# Patient Record
Sex: FEMALE | URBAN - METROPOLITAN AREA
[De-identification: names, ages, dates, MRNs, and addresses within clinical notes are randomized per-mention and may not be internally consistent; named-entity substitution may affect disease eponyms.]

---

## 2018-04-03 ENCOUNTER — APPOINTMENT (RX ONLY)
Dept: URBAN - METROPOLITAN AREA CLINIC 64 | Facility: CLINIC | Age: 68
Setting detail: DERMATOLOGY
End: 2018-04-03

## 2018-04-03 DIAGNOSIS — L81.4 OTHER MELANIN HYPERPIGMENTATION: ICD-10-CM

## 2018-04-03 DIAGNOSIS — L82.1 OTHER SEBORRHEIC KERATOSIS: ICD-10-CM

## 2018-04-03 PROBLEM — E03.9 HYPOTHYROIDISM, UNSPECIFIED: Status: ACTIVE | Noted: 2018-04-03

## 2018-04-03 PROCEDURE — ? LIQUID NITROGEN (COSMETIC)

## 2018-04-03 PROCEDURE — 99213 OFFICE O/P EST LOW 20 MIN: CPT

## 2018-04-03 PROCEDURE — ? COUNSELING

## 2018-04-03 ASSESSMENT — LOCATION DETAILED DESCRIPTION DERM
LOCATION DETAILED: LEFT INFERIOR CENTRAL MALAR CHEEK
LOCATION DETAILED: RIGHT INFERIOR LATERAL NECK

## 2018-04-03 ASSESSMENT — LOCATION SIMPLE DESCRIPTION DERM
LOCATION SIMPLE: LEFT CHEEK
LOCATION SIMPLE: RIGHT ANTERIOR NECK

## 2018-04-03 ASSESSMENT — LOCATION ZONE DERM
LOCATION ZONE: FACE
LOCATION ZONE: NECK

## 2018-04-03 NOTE — PROCEDURE: LIQUID NITROGEN (COSMETIC)
Detail Level: Zone
Consent: The patient's consent was obtained including but not limited to risks of crusting, scabbing, blistering, scarring, darker or lighter pigmentary change, recurrence, incomplete removal and infection.
Price (Use Numbers Only, No Special Characters Or $): 200
Post-Care Instructions: I reviewed with the patient in detail post-care instructions. Patient is to wear sunprotection, and avoid picking at any of the treated lesions. Pt may apply Vaseline to crusted or scabbing areas.
Total Number Of Lesions Treated: 14

## 2018-04-03 NOTE — PROCEDURE: COUNSELING
Detail Level: Simple
Detail Level: Detailed
Patient Specific Counseling (Will Not Stick From Patient To Patient): The patient's consent was obtained including but not limited to risks of crusting, scabbing, blistering, scarring, darker or lighter pigmentary change, recurrence, incomplete removal and infection.

## 2020-01-07 ENCOUNTER — LAB REQUISITION (OUTPATIENT)
Dept: LAB | Facility: HOSPITAL | Age: 70
End: 2020-01-07

## 2020-01-07 ENCOUNTER — OUTSIDE FACILITY SERVICE (OUTPATIENT)
Dept: GASTROENTEROLOGY | Facility: CLINIC | Age: 70
End: 2020-01-07

## 2020-01-07 DIAGNOSIS — Z12.11 ENCOUNTER FOR SCREENING FOR MALIGNANT NEOPLASM OF COLON: ICD-10-CM

## 2020-01-07 PROCEDURE — 88305 TISSUE EXAM BY PATHOLOGIST: CPT | Performed by: INTERNAL MEDICINE

## 2020-01-07 PROCEDURE — 45385 COLONOSCOPY W/LESION REMOVAL: CPT | Performed by: INTERNAL MEDICINE

## 2020-01-08 LAB
CYTO UR: NORMAL
LAB AP CASE REPORT: NORMAL
LAB AP CLINICAL INFORMATION: NORMAL
PATH REPORT.FINAL DX SPEC: NORMAL
PATH REPORT.GROSS SPEC: NORMAL

## 2023-12-19 ENCOUNTER — OFFICE VISIT (OUTPATIENT)
Dept: ORTHOPEDIC SURGERY | Facility: CLINIC | Age: 73
End: 2023-12-19
Payer: MEDICARE

## 2023-12-19 VITALS
SYSTOLIC BLOOD PRESSURE: 128 MMHG | BODY MASS INDEX: 28.93 KG/M2 | HEIGHT: 66 IN | WEIGHT: 180 LBS | DIASTOLIC BLOOD PRESSURE: 84 MMHG

## 2023-12-19 DIAGNOSIS — S52.572A OTHER CLOSED INTRA-ARTICULAR FRACTURE OF DISTAL END OF LEFT RADIUS, INITIAL ENCOUNTER: Primary | ICD-10-CM

## 2023-12-19 RX ORDER — OMEPRAZOLE 20 MG/1
20 TABLET, DELAYED RELEASE ORAL DAILY
COMMUNITY

## 2023-12-19 RX ORDER — BUPROPION HYDROCHLORIDE 150 MG/1
TABLET ORAL
COMMUNITY
Start: 2023-12-01

## 2023-12-19 RX ORDER — AMPICILLIN TRIHYDRATE 250 MG
500 CAPSULE ORAL DAILY
COMMUNITY

## 2023-12-19 RX ORDER — HYDROCODONE BITARTRATE AND ACETAMINOPHEN 5; 325 MG/1; MG/1
1 TABLET ORAL EVERY 6 HOURS PRN
Qty: 15 TABLET | Refills: 0 | Status: SHIPPED | OUTPATIENT
Start: 2023-12-19 | End: 2023-12-21 | Stop reason: SDUPTHER

## 2023-12-19 RX ORDER — LEVOTHYROXINE SODIUM 112 UG/1
TABLET ORAL
COMMUNITY
Start: 2023-10-12

## 2023-12-19 RX ORDER — OXYCODONE HYDROCHLORIDE 5 MG/1
5 TABLET ORAL EVERY 6 HOURS PRN
Qty: 15 TABLET | Refills: 0 | Status: SHIPPED | OUTPATIENT
Start: 2023-12-19 | End: 2023-12-19

## 2023-12-19 RX ORDER — FEXOFENADINE HCL 180 MG/1
180 TABLET ORAL DAILY
COMMUNITY

## 2023-12-19 NOTE — LETTER
December 19, 2023       No Recipients    Patient: Salud Dumont   YOB: 1950   Date of Visit: 12/19/2023     Dear Dr. Delgado Recipients:    Thank you for referring Salud Dumont to me for evaluation. Below are the relevant portions of my assessment and plan of care.    If you have questions, please do not hesitate to call me. I look forward to following Salud along with you.         Sincerely,        Arnold Toro MD        CC:   No Recipients      Progress Notes:      [facility]        Subjective     Pain of the Left Wrist (DOI: 12/16/23)      HPI    Salud Dumont is a 73 y.o. female.  Patient is a 73-year-old right-hand-dominant female who injured her left distal radius on 12/16/2023 when she was at her nephew's house and 2 dogs were fighting and caused her to fall on outstretched left wrist.  She went to the [facility] in [place] and underwent a closed reduction.  She is placed in a splint and sent here for further evaluation and treatment.          Past Medical History:   Diagnosis Date   • Fracture, radius 12/16/23      Past Surgical History:   Procedure Laterality Date   • BACK SURGERY  1990   • SHOULDER SURGERY  2004 and 2008      History reviewed. No pertinent family history.  Social History     Socioeconomic History   • Marital status:    Tobacco Use   • Smoking status: Former     Packs/day: 1     Types: Cigarettes   • Tobacco comments:     Quit in 1993   Substance and Sexual Activity   • Alcohol use: Yes     Alcohol/week: 4.0 standard drinks of alcohol     Types: 4 Glasses of wine per week   • Drug use: Never   • Sexual activity: Not Currently     Partners: Male      Current Outpatient Medications on File Prior to Visit   Medication Sig Dispense Refill   • B Complex Vitamins (B COMPLEX PO) Take 1 tablet by mouth Daily.     • buPROPion XL (WELLBUTRIN XL) 150 MG 24 hr tablet      • Cholecalciferol 125 MCG (5000 UT) tablet Take 1  "tablet by mouth Daily.     • Cinnamon 500 MG capsule Take 1 capsule by mouth Daily.     • fexofenadine (ALLEGRA) 180 MG tablet Take 1 tablet by mouth Daily.     • levothyroxine (SYNTHROID, LEVOTHROID) 112 MCG tablet      • omeprazole OTC (PriLOSEC OTC) 20 MG EC tablet Take 1 tablet by mouth Daily.     • polyethylene glycol (GoLYTELY) 236 g solution Follow Directions On Paperwork Mailed From Office. If You Have Questions Call [phone]. 4000 mL 0     No current facility-administered medications on file prior to visit.      No Known Allergies       Review of Systems   Constitutional: Negative.    HENT: Negative.     Eyes: Negative.    Respiratory: Negative.     Cardiovascular: Negative.    Gastrointestinal: Negative.    Endocrine: Negative.    Genitourinary: Negative.    Musculoskeletal: Negative.    Skin: Negative.    Allergic/Immunologic: Negative.    Neurological: Negative.    Hematological: Negative.    Psychiatric/Behavioral: Negative.          I reviewed the patient's chief complaint, history of present illness, review of systems, past medical history, surgical history, family history, social history, medications and allergy list.        Objective      Physical Exam  /84   Ht 166.4 cm (65.5\") Comment: Patient declined to get weight and supplied weight  Wt 81.6 kg (180 lb) Comment: Patient declined to get weight and supplied it.  BMI 29.50 kg/m²     Body mass index is 29.5 kg/m².    General  Mental Status - alert  General Appearance - cooperative, well groomed, not in acute distress  Orientation - Oriented X3  Build & Nutrition - well developed and well nourished  Posture - normal posture  Gait - normal gait       Ortho Exam  Peripheral Vascular   Left Upper Extremity    No cyanotic nail beds    Pink nail beds and rapid capillary refill   Palpation    Radial Pulse - Bilaterally normal    Musculoskeletal   Left Upper Extremity   Radius:    Inspection and Palpation:    Tenderness -Moderately tender and " about the wrist    Swelling - present    Effusion - na    Muscle tone - no atrophy    Pulses - +2   Deformities/Malalignments/Discrepancies    Normal bony contour    There is a documented closed fracture : location - left - distal end       Imaging/Studies  Imaging Results (Last 24 Hours)       ** No results found for the last 24 hours. **          We have reviewed images and report of an x-ray pre and postreduction of the patient's left wrist from 12/16/2023.  Patient appears to have an intra-articular fracture of the left distal radius.  Postreduction films show significant comminution of the intra-articular portion.    Assessment   Assessment:  1. Other closed intra-articular fracture of distal end of left radius, initial encounter        Plan:  Continue over-the-counter medication as needed for discomfort  Intra-articular left distal radius fracture--plan is for CT scan today and patient will hopefully see Dr. Laguna tomorrow for discussion about possible surgical intervention and options.  She will be given a prescription for hydrocodone today.        Arnold Toro MD  12/19/23  08:45 EST      Dictated Utilizing Dragon Dictation.

## 2023-12-19 NOTE — PROGRESS NOTES
[facility] [place] [facility]        Subjective     Pain of the Left Wrist (DOI: 12/16/23)      CARLOS Dumont is a 73 y.o. female.  Patient is a 73-year-old right-hand-dominant female who injured her left distal radius on 12/16/2023 when she was at her nephew's house and 2 dogs were fighting and caused her to fall on outstretched left wrist.  She went to the [facility] in [place] and underwent a closed reduction.  She is placed in a splint and sent here for further evaluation and treatment.          Past Medical History:   Diagnosis Date    Fracture, radius 12/16/23      Past Surgical History:   Procedure Laterality Date    BACK SURGERY  1990    SHOULDER SURGERY  2004 and 2008      History reviewed. No pertinent family history.  Social History     Socioeconomic History    Marital status:    Tobacco Use    Smoking status: Former     Packs/day: 1     Types: Cigarettes    Tobacco comments:     Quit in 1993   Substance and Sexual Activity    Alcohol use: Yes     Alcohol/week: 4.0 standard drinks of alcohol     Types: 4 Glasses of wine per week    Drug use: Never    Sexual activity: Not Currently     Partners: Male      Current Outpatient Medications on File Prior to Visit   Medication Sig Dispense Refill    B Complex Vitamins (B COMPLEX PO) Take 1 tablet by mouth Daily.      buPROPion XL (WELLBUTRIN XL) 150 MG 24 hr tablet       Cholecalciferol 125 MCG (5000 UT) tablet Take 1 tablet by mouth Daily.      Cinnamon 500 MG capsule Take 1 capsule by mouth Daily.      fexofenadine (ALLEGRA) 180 MG tablet Take 1 tablet by mouth Daily.      levothyroxine (SYNTHROID, LEVOTHROID) 112 MCG tablet       omeprazole OTC (PriLOSEC OTC) 20 MG EC tablet Take 1 tablet by mouth Daily.      polyethylene glycol (GoLYTELY) 236 g solution Follow Directions On Paperwork Mailed From Office. If You Have Questions Call [phone]. 4000 mL 0     No current facility-administered medications on file prior to  "visit.      No Known Allergies       Review of Systems   Constitutional: Negative.    HENT: Negative.     Eyes: Negative.    Respiratory: Negative.     Cardiovascular: Negative.    Gastrointestinal: Negative.    Endocrine: Negative.    Genitourinary: Negative.    Musculoskeletal: Negative.    Skin: Negative.    Allergic/Immunologic: Negative.    Neurological: Negative.    Hematological: Negative.    Psychiatric/Behavioral: Negative.          I reviewed the patient's chief complaint, history of present illness, review of systems, past medical history, surgical history, family history, social history, medications and allergy list.        Objective      Physical Exam  /84   Ht 166.4 cm (65.5\") Comment: Patient declined to get weight and supplied weight  Wt 81.6 kg (180 lb) Comment: Patient declined to get weight and supplied it.  BMI 29.50 kg/m²     Body mass index is 29.5 kg/m².    General  Mental Status - alert  General Appearance - cooperative, well groomed, not in acute distress  Orientation - Oriented X3  Build & Nutrition - well developed and well nourished  Posture - normal posture  Gait - normal gait       Ortho Exam  Peripheral Vascular   Left Upper Extremity    No cyanotic nail beds    Pink nail beds and rapid capillary refill   Palpation    Radial Pulse - Bilaterally normal    Musculoskeletal   Left Upper Extremity   Radius:    Inspection and Palpation:    Tenderness -Moderately tender and about the wrist    Swelling - present    Effusion - na    Muscle tone - no atrophy    Pulses - +2   Deformities/Malalignments/Discrepancies    Normal bony contour    There is a documented closed fracture : location - left - distal end       Imaging/Studies  Imaging Results (Last 24 Hours)       ** No results found for the last 24 hours. **          We have reviewed images and report of an x-ray pre and postreduction of the patient's left wrist from 12/16/2023.  Patient appears to have an intra-articular fracture " of the left distal radius.  Postreduction films show significant comminution of the intra-articular portion.    Assessment    Assessment:  1. Other closed intra-articular fracture of distal end of left radius, initial encounter        Plan:  Continue over-the-counter medication as needed for discomfort  Intra-articular left distal radius fracture--plan is for CT scan today and patient will hopefully see Dr. Laguna tomorrow for discussion about possible surgical intervention and options.  She will be given a prescription for hydrocodone today.        Arnold Toro MD  12/19/23  08:45 EST      Dictated Utilizing Dragon Dictation.

## 2023-12-20 ENCOUNTER — HOSPITAL ENCOUNTER (OUTPATIENT)
Dept: CT IMAGING | Facility: HOSPITAL | Age: 73
Discharge: HOME | End: 2023-12-20
Admitting: ORTHOPAEDIC SURGERY
Payer: MEDICARE

## 2023-12-20 ENCOUNTER — OFFICE VISIT (OUTPATIENT)
Dept: ORTHOPEDIC SURGERY | Facility: CLINIC | Age: 73
End: 2023-12-20
Payer: MEDICARE

## 2023-12-20 VITALS
BODY MASS INDEX: 31.21 KG/M2 | WEIGHT: 194.2 LBS | DIASTOLIC BLOOD PRESSURE: 72 MMHG | HEIGHT: 66 IN | SYSTOLIC BLOOD PRESSURE: 160 MMHG

## 2023-12-20 DIAGNOSIS — S52.572A OTHER CLOSED INTRA-ARTICULAR FRACTURE OF DISTAL END OF LEFT RADIUS, INITIAL ENCOUNTER: Primary | ICD-10-CM

## 2023-12-20 DIAGNOSIS — S52.572A OTHER CLOSED INTRA-ARTICULAR FRACTURE OF DISTAL END OF LEFT RADIUS, INITIAL ENCOUNTER: ICD-10-CM

## 2023-12-20 PROCEDURE — 73200 CT UPPER EXTREMITY W/O DYE: CPT

## 2023-12-20 RX ORDER — OXYCODONE HYDROCHLORIDE 5 MG/1
TABLET ORAL AS NEEDED
COMMUNITY
Start: 2023-12-19

## 2023-12-20 NOTE — PROGRESS NOTES
[facility]     Office Visit       Date: 12/20/2023   Patient Name: Salud Dumont  MRN: [id number]  YOB: 1950    Referring Physician: No ref. provider found     Chief Complaint:   Chief Complaint   Patient presents with    Left Wrist - Pain     Other closed intra-articular fracture of distal end of left radius (DOI: 12/16/23)       History of Present Illness:   Salud Dumont is a 73 y.o. female  R-hand-dominant presents with a left distal radius fracture sustained after a chemical fall from standing after being knocked over by her dogs.     Since the injury, patient saw my partner Dr. Jaramillo who referred her to me.  She had a CT scan which demonstrates a comminuted left articular distal radius fracture.  She denies numbness and tingling.  [place]  He is not working.  Denies smoking.  Otherwise healthy and active.         Subjective   Review of Systems:   Review of Systems   Constitutional: Negative.  Negative for chills, fatigue and fever.   HENT: Negative.  Negative for congestion and dental problem.    Eyes: Negative.  Negative for blurred vision.   Respiratory: Negative.  Negative for shortness of breath.    Cardiovascular: Negative.  Negative for leg swelling.   Gastrointestinal: Negative.  Negative for abdominal pain.   Endocrine: Negative.  Negative for polyuria.   Genitourinary: Negative.  Negative for difficulty urinating.   Musculoskeletal:  Positive for arthralgias.   Skin: Negative.    Allergic/Immunologic: Negative.    Neurological: Negative.    Hematological: Negative.  Negative for adenopathy.   Psychiatric/Behavioral: Negative.  Negative for behavioral problems.         Pertinent review of systems per HPI.     I reviewed the patient's chief complaint, history of present illness, review of systems, past medical history, surgical history, family history, social history,  "medications and allergy list in the EMR on 12/20/2023 and agree with the findings above.    Objective    Vital Signs:   Vitals:    12/20/23 0930   BP: 160/72   Weight: 88.1 kg (194 lb 3.2 oz)   Height: 166.4 cm (65.5\")     BMI: BMI is >= 30 and <35. (Class 1 Obesity). The following options were offered after discussion;: weight loss educational material (shared in after visit summary)       General Appearance: No acute distress. Alert and oriented.     Chest:  Non-labored breathing on room air. Regular rate and rhythm.    Left upper Extremity Exam:    No palpable masses or visible lesions  Fingers are warm, well-perfused with appropriate capillary refill.  Palpable radial pulse.    Sensation intact to light touch in median, radial and ulnar nerve distributions.    Motor-patient is able to grossly wiggle her fingers under the splint    Non-tender except for in the areas highlighted below    Splint intact.  Swelling and edema just proximal to the splint.    Imaging/Studies:   Imaging Results (Last 24 Hours)       ** No results found for the last 24 hours. **            X-ray and CT scan of the left wrist from 12/20/2023 was independently reviewed and interpreted by myself demonstrates a comminuted intra-articular fracture of the left distal radius    Procedures:  Procedures    Quality Measures:   ACP:   ACP discussion was declined by the patient. Patient does not have an advance directive, declines further assistance.    Tobacco:   Salud Franklin Julia  reports that she has quit smoking. Her smoking use included cigarettes. She smoked an average of 1 pack per day. She does not have any smokeless tobacco history on file..     Assessment / Plan    Assessment/Plan:     Diagnoses and all orders for this visit:    Other closed intra-articular fracture of distal end of left radius, initial encounter  -     External Facility Surgical/Procedural Request; Future    Other orders  -     oxyCODONE (ROXICODONE) 5 MG immediate " release tablet; As Needed.         Salud Pearce a 73 y.o. female who presents with:      ICD-10-CM ICD-9-CM   1. Other closed intra-articular fracture of distal end of left radius, initial encounter  S52.572A 813.42         Regarding the patient's distal radius fracture:        Given the patient's age, activity level, and the fracture pattern I would recommend operative management with open reduction and internal fixation of the fracture.  Fracture does appear amenable to fixation with a volar locking plate however given her age, the intra-articular comminution she may warrant supplemental fixation with a dorsal spanning plate.  Her  is an orthopedic surgeon and she expressed understanding of the procedure.    Consent-open reduction internal fixation of left distal radius fracture, possible dorsal spanning plate    The risks and benefits of the procedure were discussed with the patient and or appropriate guardian, which include but are not limited to the risk of bleeding, infection, neurovascular damage, post-operative stiffness, recurrence, tendon and/or ligament retears, recurrent instability, continued pain, arthritic pain, need for further revision surgeries in the future, deep venous thrombosis, and general risks from anesthesia. We also discussed the post-operative rehabilitation, the need for physical therapy, and the overall expected outcomes from the procedure. We also discussed the possible use of biologics including allograft. We allowed proper time and answered the patient's questions regarding the procedure. The patient expressed understanding. Knowing what the risks are and what the conservative treatment is, the patient elected to forgo any further conservative treatment options and proceed with the surgical intervention. A surgical consent was signed.      Follow Up:   Return for Follow Up after Post Op.        Lyle Laguna MD  [facility]

## 2023-12-21 ENCOUNTER — DOCUMENTATION (OUTPATIENT)
Age: 73
End: 2023-12-21
Payer: MEDICARE

## 2023-12-21 ENCOUNTER — OUTSIDE FACILITY SERVICE (OUTPATIENT)
Dept: ORTHOPEDIC SURGERY | Facility: CLINIC | Age: 73
End: 2023-12-21
Payer: MEDICARE

## 2023-12-21 DIAGNOSIS — S52.572A OTHER CLOSED INTRA-ARTICULAR FRACTURE OF DISTAL END OF LEFT RADIUS, INITIAL ENCOUNTER: ICD-10-CM

## 2023-12-21 PROCEDURE — 73110 X-RAY EXAM OF WRIST: CPT | Performed by: PLASTIC SURGERY

## 2023-12-21 PROCEDURE — 25609 OPTX DST RD XART FX/EP SEP3+: CPT | Performed by: PLASTIC SURGERY

## 2023-12-21 RX ORDER — HYDROCODONE BITARTRATE AND ACETAMINOPHEN 5; 325 MG/1; MG/1
1 TABLET ORAL EVERY 6 HOURS PRN
Qty: 20 TABLET | Refills: 0 | Status: SHIPPED | OUTPATIENT
Start: 2023-12-21

## 2023-12-21 NOTE — PROGRESS NOTES
DATE OF PROCEDURE: 12/21/2023    LOCATION: [facility]     PROCEDURES PERFORMED:    1.  Open reduction internal fixation of left distal radius intra-articular fracture <3 parts CPT 81310      SURGEON: Lyle Laguna MD      ASSISTANTS:    1. none        * Surgery not found *      ANESTHESIA: Axillary block with general anesthesia    PREOPERATIVE DIAGNOSES:  1.  Left distal radius fracture     POSTOPERATIVE DIAGNOSES:    Same     ESTIMATED BLOOD LOSS: 10 mL.    SPECIMENS: none    IMPLANTS: [employer] Geminus 4-hole narrow distal radius volar locking plate    COMPLICATIONS: None     INDICATIONS:  Salud Dumont is a 73 y.o. female who initially presented with an intra-articular fracture of the left distal radius status post mechanical fall from standing.  The risks, benefits, alternatives and potential complications of surgery were discussed with the patient and they agreed to proceed with surgery. A surgical informed consent was signed prior to the procedure.     DESCRIPTION OF PROCEDURE:  The patient was greeted in the pre-operative holding area and the surgical site was marked and consent confirmed prior to bringing the patient to the operating room.  The patient then received a left axillary block that was placed by the anesthesia team.  The patient was then taken to the operating room and a timeout was performed including the patient's name, procedure and antibiotic administration prior to the patient receiving general anesthesia.  The patient was positioned supine on the operative room table and a non-sterile tourniquet was applied to the left upper extremity and it was then prepped and draped in the usual sterile fashion.  The patient received the appropriate antibiotics within 1 hour of skin incision.     The left upper extremity was then exsanguinated using an Esmarch and the tourniquet set to 250 mmHg.  An incision was made directly over the location of the FCR tendon  with a hockey-stick extension distally.  Dissection was then carried through the superficial sheath of the FCR tendon and it was retracted ulnarly.  The floor of the FCR sheath was then incised sharply, taking care to protect the superficial branch of the radial artery.  Blunt dissection was then used to sweep the FPL and contents of the carpal tunnel away from the pronator quadratus and then retracted ulnarly.  The pronator quadratus was then sharply incised from its radial and distal aspect using a 15 blade scalpel and periosteal dissection was used to expose distal radius metaphysis and the fracture.  The brachioradialis was then identified at its attachment to the radial styloid and then incised sharply to allow easier mobilization of the radial styloid fragment.  Exposure of the fracture revealed multiple intra-articular fragments including a volar ulnar corner and radial styloid fragment. I felt that these were large, stable and proximal enough to support a volar locking plate.     After the fracture was identified 10 pounds of traction were applied to the index and middle finger to help restore length and radial inclination.  The fracture was then debrided of any early callus and gently mobilized using a rongeur and [place]. The reduction was then completed by flexing the distal fragment and applying ulnar deviation to the hand.  Reduction was then confirmed using fluoroscopy.    A [employer] Geminus 4 hole narrow distal radius volar locking plate was then selected for fixation. This was applied and then temporarily fixated distally using 2.62 K wires in the designated holes. Fluoroscopy was then used to confirm and adjust the plate position until we were happy with both the position and the reduction. Next a bicortical nonlocking screw was placed in the oblong hole.  The distal locking holes were then filled with locking screws.  A .62 k-wire was place transversely from the radial styloid into the ulnar  fragment for additional stabilization. The remaining proximal holes were then drilled and fixated with locking screws.     After complete fixation of the plate we loaded the joint with extension, flexion, and axial loading under fluoroscopy to ensure that our construct was stable.  DRUJ was then stressed and both pronation and supination and found to be stable.     The tourniquet was then let down and hemostasis achieved using bipolar electrocautery.  The wound was irrigated with copious amounts of normal saline solution.  The pronator was then redraped over the plate and skin closed in layered fashion using 3-0 Monocryl in deep dermal fashion followed by 4-0 Monocryl in running subcuticular fashion.     A sterile dressing was then applied with Steri-Strips, 4 x 4's, Webril and a volar splint.     At the end of the procedure the patient was awoken from anesthesia and transferred to the PACU in stable condition.  I participated in all parts of the case.

## 2023-12-26 ENCOUNTER — DOCUMENTATION (OUTPATIENT)
Dept: ORTHOPEDIC SURGERY | Facility: CLINIC | Age: 73
End: 2023-12-26
Payer: MEDICARE

## 2023-12-26 NOTE — PROGRESS NOTES
Left Wrist Intra-operative Fluoroscopy 12/21/23    [facility]    Indication: s/p ORIF    Views:  AP, Lateral, and 30 degrees lateral joint view     Comparison: preoperative x-rays left wrist    Findings:  Patient is status post ORIF of the distal radius.  Alignment is improved..  The hardware is intact.    Normal soft tissues.  Normal joint spaces      Impression:  X-ray of the left wrist personally reviewed by me.  Fracture of the left distal radius s/p ORIF with intact hardware.

## 2023-12-27 ENCOUNTER — OFFICE VISIT (OUTPATIENT)
Dept: ORTHOPEDIC SURGERY | Facility: CLINIC | Age: 73
End: 2023-12-27
Payer: MEDICARE

## 2023-12-27 VITALS — TEMPERATURE: 98.7 F

## 2023-12-27 DIAGNOSIS — S52.572A OTHER CLOSED INTRA-ARTICULAR FRACTURE OF DISTAL END OF LEFT RADIUS, INITIAL ENCOUNTER: Primary | ICD-10-CM

## 2023-12-27 PROCEDURE — 1160F RVW MEDS BY RX/DR IN RCRD: CPT | Performed by: PLASTIC SURGERY

## 2023-12-27 PROCEDURE — 1159F MED LIST DOCD IN RCRD: CPT | Performed by: PLASTIC SURGERY

## 2023-12-27 PROCEDURE — 99024 POSTOP FOLLOW-UP VISIT: CPT | Performed by: PLASTIC SURGERY

## 2023-12-27 NOTE — PROGRESS NOTES
[facility]     Follow-up Office Visit       Date: 12/27/2023   Patient Name: Salud Dumont  MRN: [id number]  YOB: 1950    Chief Complaint:   Chief Complaint   Patient presents with    Post-op     1 week s/p Left Wrist Intra-operative Fluoroscopy  DOS 12/21/23         History of Present Illness:   Salud Dumont is a 73 y.o. female is 1 week status post left distal radius ORIF.  She reports she been doing well since surgery.  She reports some left thumb stiffness as well as pain at her radial styloid pin site but otherwise no complaints.  Denies numbness or tingling.      Subjective   Review of Systems:   Review of Systems   Constitutional: Negative.  Negative for chills, fatigue and fever.   HENT: Negative.  Negative for congestion and dental problem.    Eyes: Negative.  Negative for blurred vision.   Respiratory: Negative.  Negative for shortness of breath.    Cardiovascular: Negative.  Negative for leg swelling.   Gastrointestinal: Negative.  Negative for abdominal pain.   Endocrine: Negative.  Negative for polyuria.   Genitourinary: Negative.  Negative for difficulty urinating.   Musculoskeletal:  Positive for arthralgias.   Skin: Negative.    Allergic/Immunologic: Negative.    Neurological: Negative.    Hematological: Negative.  Negative for adenopathy.   Psychiatric/Behavioral: Negative.  Negative for behavioral problems.         Pertinent review of systems per HPI    I reviewed the patient's chief complaint, history of present illness, review of systems, past medical history, surgical history, family history, social history, medications and allergy list in the EMR on 12/27/2023 and agree with the findings above.    Objective    Vital Signs:   Vitals:    12/27/23 0821   Temp: 98.7 °F (37.1 °C)     BMI: BMI is >= 30 and <35. (Class 1 Obesity). The following options were offered after discussion;:  weight loss educational material (shared in after visit summary)       General Appearance: No acute distress. Alert and oriented.     Chest:  Non-labored breathing on room air      Left upper Extremity:  Left Steri-Strips intact clear dry intact.  Radial styloid pin site closed and clear dry intact.  Fingers warm and well-perfused distally  Sensation intact to light touch in the median, radian and ulnar nerve distributions    Imaging/Studies:   Imaging Results (Last 24 Hours)       Procedure Component Value Units Date/Time    XR Wrist 3+ View Left [[id number]] Resulted: 12/27/23 0855     Updated: 12/27/23 0857    Narrative:      Left Wrist X-Ray    Indication: s/p ORIF    Views:  AP, Lateral, and Oblique     Comparison: None    Findings:  Patient is status post ORIF of the distal radius.  Alignment remains stable.  The fracture appears to be healing appropriately.  The hardware is intact.      Normal soft tissues.  Normal joint spaces      Impression:  X-ray of the left wrist personally reviewed by me.  Fracture   of the left distal radius s/p ORIF  with intact hardware.                   Procedures:  Procedures    Quality Measures:   ACP:   ACP discussion was declined by the patient. Patient does not have an advance directive, declines further assistance.    Tobacco:   Salud Franklin Nathaliearmin  reports that she has quit smoking. Her smoking use included cigarettes. She smoked an average of 1 pack per day. She does not have any smokeless tobacco history on file..    Assessment / Plan    Assessment/Plan:      Diagnosis Plan   1. Other closed intra-articular fracture of distal end of left radius, initial encounter  XR Wrist 3+ View Left          Patient presents 1 week follow-up status post left distal radius ORIF.  She has been doing well postoperatively.  We will transition her to an Exos brace today and have her follow-up in 2 weeks for repeat x-rays.  Recommend continue left upper extremity elevation and home therapy  for thumb and finger exercises.    Follow Up:   Return in about 2 weeks (around 1/10/2024).        Lyle Laguna MD  [facility] [place] Hand and Upper Extremity Surgeon

## 2023-12-28 ENCOUNTER — HOSPITAL ENCOUNTER (OUTPATIENT)
Dept: GENERAL RADIOLOGY | Facility: HOSPITAL | Age: 73
Discharge: HOME | End: 2023-12-28
Admitting: PLASTIC SURGERY
Payer: MEDICARE

## 2023-12-28 ENCOUNTER — TELEPHONE (OUTPATIENT)
Dept: ORTHOPEDIC SURGERY | Facility: CLINIC | Age: 73
End: 2023-12-28
Payer: MEDICARE

## 2023-12-28 ENCOUNTER — OFFICE VISIT (OUTPATIENT)
Age: 73
End: 2023-12-28
Payer: MEDICARE

## 2023-12-28 DIAGNOSIS — Z09 SURGERY FOLLOW-UP: Primary | ICD-10-CM

## 2023-12-28 DIAGNOSIS — Z09 SURGERY FOLLOW-UP: ICD-10-CM

## 2023-12-28 PROCEDURE — 99024 POSTOP FOLLOW-UP VISIT: CPT | Performed by: PLASTIC SURGERY

## 2023-12-28 PROCEDURE — 1159F MED LIST DOCD IN RCRD: CPT | Performed by: PLASTIC SURGERY

## 2023-12-28 PROCEDURE — 73110 X-RAY EXAM OF WRIST: CPT

## 2023-12-28 PROCEDURE — 1160F RVW MEDS BY RX/DR IN RCRD: CPT | Performed by: PLASTIC SURGERY

## 2023-12-28 NOTE — PROGRESS NOTES
[facility]     Follow-up Office Visit       Date: 12/28/2023   Patient Name: Salud Dumont  MRN: [id number]  YOB: 1950    Chief Complaint:   Chief Complaint   Patient presents with    Follow-up     1 day recheck -  s/p Left Wrist Intra-operative Fluoroscopy  DOS 12/21/23       History of Present Illness:   Salud Dumont is a 73 y.o. female presents for follow-up status post left distal radius ORIF on 12/21/2023.  She was seen in the office yesterday and doing well, however she noticed that her left radial styloid pin site has opened up this morning after shower.  She denies drainage.  She is otherwise been compliant with her brace.      Subjective   Review of Systems:   Review of Systems   Constitutional:  Negative for chills, fever, unexpected weight gain and unexpected weight loss.   HENT:  Negative for congestion, postnasal drip and rhinorrhea.    Eyes:  Negative for blurred vision.   Respiratory:  Negative for shortness of breath.    Cardiovascular:  Negative for leg swelling.   Gastrointestinal:  Negative for abdominal pain, nausea and vomiting.   Genitourinary:  Negative for difficulty urinating.   Musculoskeletal:  Positive for arthralgias. Negative for gait problem, joint swelling and myalgias.   Skin:  Negative for skin lesions and wound.   Neurological:  Negative for dizziness, weakness, light-headedness and numbness.   Hematological:  Does not bruise/bleed easily.   Psychiatric/Behavioral:  Negative for depressed mood.    All other systems reviewed and are negative.       Pertinent review of systems per HPI    I reviewed the patient's chief complaint, history of present illness, review of systems, past medical history, surgical history, family history, social history, medications and allergy list in the EMR on 12/28/2023 and agree with the findings above.    Objective    Vital Signs: There were  no vitals filed for this visit.  BMI: BMI is >= 30 and <35. (Class 1 Obesity). The following options were offered after discussion;: weight loss educational material (shared in after visit summary)       General Appearance: No acute distress. Alert and oriented.     Chest:  Non-labored breathing on room air      Left upper Extremity:  Incision clear dry intact and healing appropriately.  There is a small pinpoint open wound at the site of her left radial styloid pin the K wires visualized directly at the base of the wound.  There is no erythema or purulent drainage.  Fingers warm and well-perfused distally  Sensation intact to light touch in the median, radian and ulnar nerve distributions    Imaging/Studies:   Imaging Results (Last 24 Hours)       ** No results found for the last 24 hours. **            Repeat x-rays of the left wrist were individually reviewed and interpreted by myself today and demonstrate evidence of stable alignment and intact hardware in her left distal radius fracture status post ORIF    Procedures:  Procedures    Quality Measures:   ACP:   ACP discussion was declined by the patient. Patient does not have an advance directive, declines further assistance.    Tobacco:   Salud Rigoberto Dumont  reports that she has quit smoking. Her smoking use included cigarettes. She smoked an average of 1 pack per day. She does not have any smokeless tobacco history on file..     Assessment / Plan    Assessment/Plan:      Diagnosis Plan   1. Surgery follow-up            Patient is 1 week status post left distal radius ORIF.  She presents today with an exposed radial styloid K wire after it is ruptured through her skin.  Given the exposed K wire in the open wound we elected for removal of the K wire today.  I will have her placed in a short arm fiberglass cast today for an additional 2 weeks to provide additional stabilization given her fracture pattern and risk of collapse.  Will have her follow-up in 1 week  for repeat x-rays.    Follow Up:   Return in about 1 week (around 1/4/2024).        Lyle Laguna MD  [facility] [place] Hand and Upper Extremity Surgeon

## 2023-12-28 NOTE — TELEPHONE ENCOUNTER
Caller: Salud Dumont     Relationship: PATIENT    Best call back number: [phone]    What is your medical concern? K WIRE HAS BROKEN THROUGH THE SKIN    How long has this issue been going on? LAST NIGHT 12.27.23    Is your provider already aware of this issue? YES    Have you been treated for this issue? PROVIDER PLANNED TO REMOVE K WIRE PER PATIENT.    UNABLE TO WARM TRANSFER

## 2024-01-04 ENCOUNTER — OFFICE VISIT (OUTPATIENT)
Age: 74
End: 2024-01-04
Payer: MEDICARE

## 2024-01-04 ENCOUNTER — HOSPITAL ENCOUNTER (OUTPATIENT)
Dept: GENERAL RADIOLOGY | Facility: HOSPITAL | Age: 74
Discharge: HOME | End: 2024-01-04
Admitting: PLASTIC SURGERY
Payer: COMMERCIAL

## 2024-01-04 DIAGNOSIS — Z09 SURGERY FOLLOW-UP: ICD-10-CM

## 2024-01-04 DIAGNOSIS — S52.572A OTHER CLOSED INTRA-ARTICULAR FRACTURE OF DISTAL END OF LEFT RADIUS, INITIAL ENCOUNTER: Primary | ICD-10-CM

## 2024-01-04 PROCEDURE — 99024 POSTOP FOLLOW-UP VISIT: CPT | Performed by: PLASTIC SURGERY

## 2024-01-04 PROCEDURE — 1160F RVW MEDS BY RX/DR IN RCRD: CPT | Performed by: PLASTIC SURGERY

## 2024-01-04 PROCEDURE — 73110 X-RAY EXAM OF WRIST: CPT

## 2024-01-04 PROCEDURE — 1159F MED LIST DOCD IN RCRD: CPT | Performed by: PLASTIC SURGERY

## 2024-01-04 NOTE — PROGRESS NOTES
[facility]     Follow-up Office Visit       Date: 01/04/2024   Patient Name: Salud Dumont  MRN: [id number]  YOB: 1950    Chief Complaint:   Chief Complaint   Patient presents with    Follow-up     1 week follow up; 2 weeks status post left distal radius ORIF 12/21/23       History of Present Illness:   Salud Dumont is a 73 y.o. female presents for follow-up status post left distal radius ORIF on 12/21/2023.  She has been doing well since her last visit.  She denies numbness or tingling.  Her pain has been well-controlled.  She has no new concerns.      Subjective   Review of Systems:   Review of Systems   Constitutional: Negative.    HENT: Negative.     Eyes: Negative.    Respiratory: Negative.     Cardiovascular: Negative.    Gastrointestinal: Negative.    Endocrine: Negative.    Genitourinary: Negative.    Musculoskeletal:  Positive for arthralgias.   Skin: Negative.    Allergic/Immunologic: Negative.    Neurological: Negative.    Hematological: Negative.    Psychiatric/Behavioral: Negative.          Pertinent review of systems per HPI    I reviewed the patient's chief complaint, history of present illness, review of systems, past medical history, surgical history, family history, social history, medications and allergy list in the EMR on 01/04/2024 and agree with the findings above.    Objective    Vital Signs: There were no vitals filed for this visit.  BMI: BMI is >= 30 and <35. (Class 1 Obesity). The following options were offered after discussion;: weight loss educational material (shared in after visit summary)       General Appearance: No acute distress. Alert and oriented.     Chest:  Non-labored breathing on room air      Left upper Extremity:  Left upper extremity cast clean dry intact.  Cast removed and Steristrips underneath c/d/i.  Fingers warm and well-perfused distally  Sensation intact  to light touch in the median, radian and ulnar nerve distributions    Imaging/Studies:   Imaging Results (Last 24 Hours)       ** No results found for the last 24 hours. **            X-ray of the left wrist from 1/4/2024 was independently reviewed by myself and demonstrates evidence of stable alignment of left distal radius status post ORIF with intact hardware.    Procedures:  Procedures    Quality Measures:   ACP:   ACP discussion was declined by the patient. Patient does not have an advance directive, declines further assistance.    Tobacco:   Salud Dumont  reports that she has quit smoking. Her smoking use included cigarettes. She smoked an average of 1 pack per day. She does not have any smokeless tobacco history on file..     Assessment / Plan    Assessment/Plan:      Diagnosis Plan   1. Other closed intra-articular fracture of distal end of left radius, initial encounter            Patient is now 2 weeks status post left distal radius open reduction internal fixation for comminuted intra-articular fracture.  She is doing well and her fracture alignment remains stable.  I will continue immobilization for an additional 2 weeks with a short arm fiberglass cast.  Follow-up in 2 weeks for cast removal and transition to an Exos med brace.  We will start gentle hand therapy at that time.    Follow Up:   Return in about 2 weeks (around 1/18/2024).        Lyle Laguna MD  [facility] [place] [facility]remity Surgeon

## 2024-01-18 ENCOUNTER — HOSPITAL ENCOUNTER (OUTPATIENT)
Dept: GENERAL RADIOLOGY | Facility: HOSPITAL | Age: 74
Discharge: HOME | End: 2024-01-18
Admitting: PLASTIC SURGERY
Payer: MEDICARE

## 2024-01-18 ENCOUNTER — OFFICE VISIT (OUTPATIENT)
Age: 74
End: 2024-01-18
Payer: MEDICARE

## 2024-01-18 DIAGNOSIS — S52.572A OTHER CLOSED INTRA-ARTICULAR FRACTURE OF DISTAL END OF LEFT RADIUS, INITIAL ENCOUNTER: Primary | ICD-10-CM

## 2024-01-18 DIAGNOSIS — Z09 SURGERY FOLLOW-UP: Primary | ICD-10-CM

## 2024-01-18 DIAGNOSIS — Z09 SURGERY FOLLOW-UP: ICD-10-CM

## 2024-01-18 PROCEDURE — 73110 X-RAY EXAM OF WRIST: CPT

## 2024-01-18 NOTE — PROGRESS NOTES
[facility]     Follow-up Office Visit       Date: 01/18/2024   Patient Name: Salud Dumont  MRN: [id number]  YOB: 1950    Chief Complaint:   Chief Complaint   Patient presents with    Post-op     2 week follow up -- 4 week status post left distal radius ORIF 12/21/23       History of Present Illness:   Salud Dumont is a 73 y.o. female presents for follow-up 4 weeks status post open reduction internal fixation of left distal radius fracture.  Patient reports that she has been doing well since her last visit.  Reports that her pain is well-controlled.  She does report some paresthesias in her left thumb that she believes has been present since surgery and has slowly improved.  Her left thumb also remains stiff.  She otherwise has no concerns today.      Subjective   Review of Systems:   Review of Systems   Constitutional:  Negative for chills, fever, unexpected weight gain and unexpected weight loss.   HENT:  Negative for congestion, postnasal drip and rhinorrhea.    Eyes:  Negative for blurred vision.   Respiratory:  Negative for shortness of breath.    Cardiovascular:  Negative for leg swelling.   Gastrointestinal:  Negative for abdominal pain, nausea and vomiting.   Genitourinary:  Negative for difficulty urinating.   Musculoskeletal:  Positive for arthralgias. Negative for gait problem, joint swelling and myalgias.   Skin:  Negative for skin lesions and wound.   Neurological:  Negative for dizziness, weakness, light-headedness and numbness.   Hematological:  Does not bruise/bleed easily.   Psychiatric/Behavioral:  Negative for depressed mood.         Pertinent review of systems per HPI    I reviewed the patient's chief complaint, history of present illness, review of systems, past medical history, surgical history, family history, social history, medications and allergy list in the EMR on 01/18/2024  and agree with the findings above.    Objective    Vital Signs: There were no vitals filed for this visit.  BMI: BMI is >= 30 and <35. (Class 1 Obesity). The following options were offered after discussion;: weight loss educational material (shared in after visit summary)       General Appearance: No acute distress. Alert and oriented.     Chest:  Non-labored breathing on room air      Left upper Extremity:  Distal radius incision clear dry intact, well-healed.  Patient with persistent swelling of the left distal radius.  Able to grossly flex and extend.  Although limited secondary to stiffness.  Negative Tinel at the carpal tunnel.  Minimally tender to palpation.  FDS and FDP DPR intact for all fingers.  Patient with full flexion extension of the fingers.  Fingers warm and well-perfused distally  Sensation intact to light touch in the median, radian and ulnar nerve distributions.     Imaging/Studies:   Imaging Results (Last 24 Hours)       ** No results found for the last 24 hours. **            X-ray of the left wrist was independently reviewed by myself and demonstrates evidence of stable distal radius fracture fixation status post open reduction internal fixation.  It does appear that one of the ulnar screws of the distal locking screws has backed down now slightly prominent.  Hardware otherwise intact    Procedures:  Procedures    Quality Measures:   ACP:   ACP discussion was declined by the patient. Patient does not have an advance directive, declines further assistance.    Tobacco:   Salud Franklin Julia  reports that she has quit smoking. Her smoking use included cigarettes. She smoked an average of 1 pack per day. She does not have any smokeless tobacco history on file..     Assessment / Plan    Assessment/Plan:      Diagnosis Plan   1. Other closed intra-articular fracture of distal end of left radius, initial encounter  Ambulatory Referral to Physical Therapy Evaluate and treat, Ortho          Presents  1 month status post left distal radius ORIF.  She is doing well since surgery and her fracture is in stable alignment.  She does have some paresthesias of her left thumb that she reports has been present since surgery however sensation is grossly intact and her index and middle finger are unaffected.  We will continue to follow along to ensure that this improves.  I discussed with the patient her x-ray findings particularly of the distal screw that is backing out and that this will likely need to be removed once her fracture is well-healed.  As long as it is not causing her problems at this time we will leave the hardware alone until her fracture is healed.  Recommend initiation of hand therapy for active and passive range of motion of the left wrist.  I will see the patient back in 4 weeks for repeat x-rays and repeat exam.    Follow Up:   Return in about 4 weeks (around 2/15/2024).        Lyle Laguna MD  [facility] [place] [facility] Extremity Surgeon

## 2024-02-07 DIAGNOSIS — Z09 SURGERY FOLLOW-UP: Primary | ICD-10-CM

## 2024-02-15 ENCOUNTER — OFFICE VISIT (OUTPATIENT)
Age: 74
End: 2024-02-15
Payer: MEDICARE

## 2024-02-15 ENCOUNTER — HOSPITAL ENCOUNTER (OUTPATIENT)
Dept: GENERAL RADIOLOGY | Facility: HOSPITAL | Age: 74
Discharge: HOME | End: 2024-02-15
Admitting: PLASTIC SURGERY
Payer: MEDICARE

## 2024-02-15 DIAGNOSIS — Z09 SURGERY FOLLOW-UP: ICD-10-CM

## 2024-02-15 DIAGNOSIS — Z09 SURGERY FOLLOW-UP: Primary | ICD-10-CM

## 2024-02-15 PROCEDURE — 73110 X-RAY EXAM OF WRIST: CPT

## 2024-03-20 DIAGNOSIS — Z09 SURGERY FOLLOW-UP: Primary | ICD-10-CM

## 2024-03-28 ENCOUNTER — HOSPITAL ENCOUNTER (OUTPATIENT)
Dept: GENERAL RADIOLOGY | Facility: HOSPITAL | Age: 74
Discharge: HOME | End: 2024-03-28
Admitting: PLASTIC SURGERY
Payer: MEDICARE

## 2024-03-28 ENCOUNTER — OFFICE VISIT (OUTPATIENT)
Age: 74
End: 2024-03-28
Payer: MEDICARE

## 2024-03-28 VITALS
DIASTOLIC BLOOD PRESSURE: 84 MMHG | HEIGHT: 66 IN | WEIGHT: 194.22 LBS | BODY MASS INDEX: 31.21 KG/M2 | SYSTOLIC BLOOD PRESSURE: 142 MMHG

## 2024-03-28 DIAGNOSIS — Z96.9 RETAINED ORTHOPEDIC HARDWARE: Primary | ICD-10-CM

## 2024-03-28 DIAGNOSIS — Z09 SURGERY FOLLOW-UP: ICD-10-CM

## 2024-03-28 PROCEDURE — 1159F MED LIST DOCD IN RCRD: CPT | Performed by: PLASTIC SURGERY

## 2024-03-28 PROCEDURE — 73110 X-RAY EXAM OF WRIST: CPT

## 2024-03-28 PROCEDURE — 99024 POSTOP FOLLOW-UP VISIT: CPT | Performed by: PLASTIC SURGERY

## 2024-03-28 PROCEDURE — 1160F RVW MEDS BY RX/DR IN RCRD: CPT | Performed by: PLASTIC SURGERY

## 2024-03-28 RX ORDER — ROSUVASTATIN CALCIUM 40 MG/1
40 TABLET, COATED ORAL
COMMUNITY
Start: 2024-03-18 | End: 2025-03-18

## 2024-03-28 NOTE — PROGRESS NOTES
[facility]     Follow-up Office Visit       Date: 03/28/2024   Patient Name: Salud Dumont  MRN: [id number]  YOB: 1950    Chief Complaint:   Chief Complaint   Patient presents with    Follow-up     6 week recheck - status post left distal radius ORIF 12/21/23       History of Present Illness:   Salud Dumont is a 74 y.o. female presents for follow-up of ORIF left distal radius fracture on 12/21/2023.  Patient reports that she has occasional left ulnar wrist pain as well as volar forearm pain w/  certain movements.  Reports that her numbness and tingling have improved significantly since her last visit.  Denies numbness or index or middle fingers.  Reports some faint decrease sensation in her thumb.  Also reports some basilar thumb pain.  No other concerns at this time.  Denies crepitus of the wrist.  Has full flexion extension of the wrist.      Subjective   Review of Systems:   Review of Systems   Constitutional:  Negative for chills, fever, unexpected weight gain and unexpected weight loss.   HENT:  Negative for congestion, postnasal drip and rhinorrhea.    Eyes:  Negative for blurred vision.   Respiratory:  Negative for shortness of breath.    Cardiovascular:  Negative for leg swelling.   Gastrointestinal:  Negative for abdominal pain, nausea and vomiting.   Genitourinary:  Negative for difficulty urinating.   Musculoskeletal:  Positive for arthralgias. Negative for gait problem, joint swelling and myalgias.   Skin:  Negative for skin lesions and wound.   Neurological:  Negative for dizziness, weakness, light-headedness and numbness.   Hematological:  Does not bruise/bleed easily.   Psychiatric/Behavioral:  Negative for depressed mood.    All other systems reviewed and are negative.       Pertinent review of systems per HPI    I reviewed the patient's chief complaint, history of present illness, review  "of systems, past medical history, surgical history, family history, social history, medications and allergy list in the EMR on 03/28/2024 and agree with the findings above.    Objective    Vital Signs:   Vitals:    03/28/24 1020   BP: 142/84   Weight: 88.1 kg (194 lb 3.6 oz)   Height: 166.4 cm (65.51\")     BMI: BMI is >= 30 and <35. (Class 1 Obesity). The following options were offered after discussion;: weight loss educational material (shared in after visit summary)       General Appearance: No acute distress. Alert and oriented.     Chest:  Non-labored breathing on room air      Left Upper Extremity:  Patient with near normal flexion extension at the wrist.  Nontender over the radiocarpal joint.  Nontender over the distal radius.  Mild tenderness to palpation over the CMC of the thumb.  Mildly tender to palpation over the distal ulna.  DRUJ stable in pronation and supination.  There is some volar swelling over the carpal tunnel but no fluctuance or induration.  Her scar is well-healed.  Fingers warm and well-perfused distally  Sensation intact to light touch in the median, radial and ulnar nerve distributions    Imaging/Studies:   Imaging Results (Last 24 Hours)       ** No results found for the last 24 hours. **            X-ray of the left wrist on 3/20/2024 demonstrates a healed left distal radius fracture status post open reduction internal fixation.  Again seen is a prominent ulnar screw that is in stable position from last x-rays.  The dorsal locking screws from the plate are also abutting the articular surface of the radius however there is no obvious screw penetration.  Again evidence of ulnar styloid fracture.    Procedures:  Procedures    Quality Measures:   Quality Measures:   ACP:   ACP discussion was declined by the patient, Patient does not have an advance directive, declines further assistance.    Tobacco:   Salud Franklin Julia  reports that she has quit smoking. Her smoking use included " cigarettes. She does not have any smokeless tobacco history on file.    Assessment / Plan    Assessment/Plan:      Diagnosis Plan   1. Retained orthopedic hardware  External Facility Surgical/Procedural Request      2. Surgery follow-up            Patient presents for follow-up of left distal radius fracture ORIF in December 2023.  She does have some evidence of a screw backing out into her carpal tunnel on x-rays as well as some collapse of her distal radius after fixation causing her distal locking screws to be close to the radiocarpal joint, although there is no crepitus on exam or evidence of roberto radiocarpal joint penetration.  I discussed this with the patient as well as the options for treatment.  Given that he does have a screw that is backed out right into the region of the carpal tunnel as well as the distal location of the screws I would recommend hardware removal now that the fracture is healed.  This will prevent future problems caused by the plate and screw position..  This will likely not address her ulnar-sided wrist pain.  Patient understands this and would like to proceed with surgery.  Will schedule her at her earliest convenience.    Consent-removal of deep hardware left wrist    The risks and benefits of the procedure were discussed with the patient and or appropriate guardian, which include but are not limited to the risk of bleeding, infection, neurovascular damage, post-operative stiffness, recurrence, tendon and/or ligament retears, recurrent instability, continued pain, arthritic pain, need for further revision surgeries in the future, deep venous thrombosis, and general risks from anesthesia. We also discussed the post-operative rehabilitation, the need for physical therapy, and the overall expected outcomes from the procedure. We also discussed the possible use of biologics including allograft. We allowed proper time and answered the patient's questions regarding the procedure. The  patient expressed understanding. Knowing what the risks are and what the conservative treatment is, the patient elected to forgo any further conservative treatment options and proceed with the surgical intervention.     Follow Up:   Return for Follow Up after Post Op.        Lyle Laguna MD  [facility] [place] Hand and Upper Extremity Surgeon

## 2024-05-02 ENCOUNTER — TELEPHONE (OUTPATIENT)
Age: 74
End: 2024-05-02
Payer: MEDICARE

## 2024-05-02 NOTE — TELEPHONE ENCOUNTER
Patient called wanting to know her arrival time for surgery tomorrow, she has not been called yet. The surgery center is very bust today and will be making calls this afternoon. She should except a call from them before 4 PM today. Also I gave the address to the surgery center, Also I reminded her that there is a map with address and phone number to surgery center in her folder that I gave her when we scheduled her surgery if that might help her. She expressed understanding and had no other questions at this time. Joanne Alejandro CMA

## 2024-05-03 ENCOUNTER — OUTSIDE FACILITY SERVICE (OUTPATIENT)
Dept: ORTHOPEDIC SURGERY | Facility: CLINIC | Age: 74
End: 2024-05-03
Payer: MEDICARE

## 2024-05-03 ENCOUNTER — DOCUMENTATION (OUTPATIENT)
Age: 74
End: 2024-05-03
Payer: MEDICARE

## 2024-05-03 DIAGNOSIS — Z96.9 RETAINED ORTHOPEDIC HARDWARE: Primary | ICD-10-CM

## 2024-05-03 PROCEDURE — 20680 REMOVAL OF IMPLANT DEEP: CPT | Performed by: PLASTIC SURGERY

## 2024-05-03 RX ORDER — OXYCODONE HYDROCHLORIDE 5 MG/1
5 TABLET ORAL EVERY 6 HOURS PRN
Qty: 8 TABLET | Refills: 0 | Status: SHIPPED | OUTPATIENT
Start: 2024-05-03

## 2024-05-03 NOTE — PROGRESS NOTES
DATE OF PROCEDURE: 05/03/2024    LOCATION: [facility]     PROCEDURES PERFORMED:    Removal of deep hardware left wrist CPT 97677    SURGEON: Lyle Laguna MD      ASSISTANTS:    1. None        * Surgery not found *      ANESTHESIA: Axillary block    PREOPERATIVE DIAGNOSES:  Retained left distal radius hardware     POSTOPERATIVE DIAGNOSES:    Same     ESTIMATED BLOOD LOSS: 1 mL.    SPECIMENS: None    IMPLANTS: None    COMPLICATIONS: None     INDICATIONS:  Salud Dumont is a 74 y.o. female who initially presented with orthopedic hardware after history of left distal radius fracture in December 2023.  There is concern for screw prominence so we discussed removal of the hardware and the patient agreed to proceed.  The risks, benefits, alternatives and potential complications of surgery were discussed with the patient including but not limited to bleeding scarring, infection, recurrence, stiffness, damage to surrounding structures or postoperative pain.  The patient understands the risks and agreed to proceed with surgery.  A surgical informed consent was signed prior to the procedure.     DESCRIPTION OF PROCEDURE:      The patient was greeted in the pre-operative holding area and the surgical site was marked and consent confirmed prior to bringing the patient to the operating room.  Procedure left upper extremity axillary block in the preoperative holding area by the anesthesia team.  The patient was then taken to the operating room and a timeout was performed including the patient's name, procedure and antibiotic administration prior to the patient receiving mac anesthesia.  The patient was positioned supine on the operative room table and a non-sterile tourniquet was applied to the left upper extremity and it was then prepped and draped in the usual sterile fashion.  The patient received the appropriate antibiotics within 1 hour of skin incision.     The left upper extremity was  exsanguinated and the tourniquet set to 250 mmHg.      The patient's prior left volar wrist incision was incised directly over the FCR tendon.  Careful blunt dissection was used to carry dissection through the subcutaneous tissue.  The FCR was identified and retracted ulnarly.  The floor of the S CR sheath was dissected using combination of careful sharp and blunt dissection.  Incision taken down to the volar distal radius.  The patient's distal radius plate was identified.  The plate was scar tissue or debris.  And then all of the locking and nonlocking screws were removed from the plate.  Removal of all hardware was then confirmed via fluoroscopy.  The wrist was stressed under fluoroscopy with flexion extension to confirm stability.  The wound was then irrigated with copious amounts normal saline solution.  Incision was then closed with 4-0 Monocryl suture in deep dermal fashion followed by 4-0 Monocryl suture in a running subcuticular fashion.  A volar resting splint was then applied.     At the end of the procedure the patient was awoken from anesthesia and transferred to the PACU in stable condition.  I participated in all parts of the case.       POSTOPERATIVE PLAN:  Dressing splint down in 1 week begin range of motion of the left wrist.  No lifting greater than 5 pounds until first postop appointment.  Left upper extremity elevation.  Tylenol, Ibuprofen as needed oxycodone for pain relief.     Tone is normal, moving all extremities well, reflexes normal for age.

## 2024-05-10 ENCOUNTER — TELEPHONE (OUTPATIENT)
Dept: ORTHOPEDIC SURGERY | Facility: CLINIC | Age: 74
End: 2024-05-10
Payer: MEDICARE

## 2024-05-17 ENCOUNTER — TELEPHONE (OUTPATIENT)
Dept: ORTHOPEDIC SURGERY | Facility: CLINIC | Age: 74
End: 2024-05-17
Payer: MEDICARE

## 2024-05-17 NOTE — TELEPHONE ENCOUNTER
HUB AGENT ATTEMPTED CLINICAL WARM TRANSFER     PLEASE SEE TEL ENC THREAD FROM 05/10/24 w/YONNY ZARAGOZA     PLEASE CALL / LEAVE VMAIL TO DISCUSS RESCHEDULING 05/20/24 POST OP APPT (PATIENT STILL OUT OF TOWN IN [place] DUE TO 's SURGERY HAS NO IDEA WHEN SHE'LL BE ABLE TO RETURN TO [place])     AND TO DISCUSS HAVING PATIENT's STITCHES TAKEN OUT FROM LEFT WRIST SURGERY DONE BY DR CISNEROS 05-03-24     THANKS

## 2024-05-20 NOTE — TELEPHONE ENCOUNTER
Called patient. She is not sure when she will be able to make it back into town. She states that her daughter clipped the ends of the sutures. She is going to send me a picture of the incision.     [place]

## 2025-03-10 RX ORDER — SODIUM, POTASSIUM,MAG SULFATES 17.5-3.13G
SOLUTION, RECONSTITUTED, ORAL ORAL
Qty: 354 ML | Refills: 0 | Status: SHIPPED | OUTPATIENT
Start: 2025-03-10

## 2025-03-20 ENCOUNTER — OUTSIDE FACILITY SERVICE (OUTPATIENT)
Dept: GASTROENTEROLOGY | Facility: CLINIC | Age: 75
End: 2025-03-20
Payer: MEDICARE

## 2025-03-20 PROCEDURE — G0105 COLORECTAL SCRN; HI RISK IND: HCPCS | Performed by: INTERNAL MEDICINE
